# Patient Record
Sex: MALE | Race: WHITE | NOT HISPANIC OR LATINO | URBAN - METROPOLITAN AREA
[De-identification: names, ages, dates, MRNs, and addresses within clinical notes are randomized per-mention and may not be internally consistent; named-entity substitution may affect disease eponyms.]

---

## 2022-06-16 DIAGNOSIS — M54.59 WEIGHT LIFTER'S BACK: Primary | ICD-10-CM

## 2022-07-25 ENCOUNTER — CLINICAL SUPPORT (OUTPATIENT)
Dept: REHABILITATION | Facility: HOSPITAL | Age: 52
End: 2022-07-25
Payer: MEDICAID

## 2022-07-25 DIAGNOSIS — M54.9 BACK PAIN, UNSPECIFIED BACK LOCATION, UNSPECIFIED BACK PAIN LATERALITY, UNSPECIFIED CHRONICITY: ICD-10-CM

## 2022-07-25 DIAGNOSIS — G89.29 CHRONIC RIGHT-SIDED LOW BACK PAIN WITHOUT SCIATICA: ICD-10-CM

## 2022-07-25 DIAGNOSIS — M54.50 CHRONIC RIGHT-SIDED LOW BACK PAIN WITHOUT SCIATICA: ICD-10-CM

## 2022-07-25 PROCEDURE — 97161 PT EVAL LOW COMPLEX 20 MIN: CPT | Mod: PN

## 2022-07-25 NOTE — PLAN OF CARE
[facility]   Physical Therapy Initial Evaluation     Date: 7/25/2022   Name: Pantera Abbott  [facility] Number: [id number]    Therapy Diagnosis:   Encounter Diagnoses   Name Primary?    Back pain, unspecified back location, unspecified back pain laterality, unspecified chronicity     Chronic right-sided low back pain without sciatica      Physician: Freddy Coronel MD    Physician Orders: PT Eval and Treat   Medical Diagnosis from Referral: M54.59 (ICD-10-CM) - Other low back pain   Evaluation Date: 7/25/2022  Authorization Period Expiration: 1/25/2023  Plan of Care Expiration: 11/25/2022  Progress Note Due: 8/25/2022  Visit # / Visits authorized: 1/ pending   FOTO: 1/3    Precautions: Standard     Time In: 1020 am  Time Out: 1100 am  Total Appointment Time (timed & untimed codes): 40 minutes      SUBJECTIVE     Date of onset: 3 weeks ago    History of current condition - Amado reports: chronic history of L4/5 issues (MRI 10 years ago). 3 weeks ago he had a sharp pain around the right side of the thoracic and lumbar spine and the pain lasted for about a week, the next week it was fine but then last week he had another flare up. He has difficulty with any bending activity, he is unable to carry heavy items like taking trash out and such. He is limited with pushing and pulling. Sitting and standing longer periods of time cause an increase in pain at times but not consistent. When in pain, he has a hard time finding comfortable position and had challenges finding position of comfort. Most painful in the mornings. His legs have buckled in the legs but no falls.     Falls: none    Imaging, none:     Prior Therapy: none  Social History: 3-4 steps to enter - can be challenging  Occupation: part time at this time - [employer]  Prior Level of Function: 3 weeks ago he was not having ans restrictions  Current Level of Function: currently unable to perform exercise, weary of reaching and bending.      Pain:  Current 4/10, worst 10/10, best 2/10   Location: right back    Description: Sharp and stabbing, tension, throbbing, has N&T at times  Aggravating Factors: Sitting, Standing and Morning  Easing Factors: pain medication, heating pad, hot bath and epson baths    Patients goals: be able to stand and return to work full time, be able to perform ADLs without sharpness coming on     Medical History:   No past medical history on file.    Surgical History:   Pantera Abbott  has no past surgical history on file.    Medications:   Pantera currently has no medications in their medication list.    Allergies:   Review of patient's allergies indicates:  Not on File       OBJECTIVE     Posture Alignment: overweight, left side lean in a seated position to un weight right side  Increased thoracic kyphosis and decreased lumbar lordosis    LUMBAR SPINE AROM:   Flexion: WFL   Extension: WFL   Left Sidebend: 75%   Right Sidebend: 50% with pain   Left Rotation: 75%   Right Rotation: 50% with pain     SEGMENTAL MOBILITY: R side sharpness and restriction present greatest at T12/L1 with restriction present from T10-L4    LOWER EXTREMITY STRENGTH: within functional limits bilaterally   Left Right   Knee Flex 5/5 5/5   Knee Ext 5/5 5/5     Hip Flexor 4+/5 4+/5   Hip IR 4+/5 4+/5   Hip ER 4+/5 4+/5       Flexibility: tightness in R>L side paraspinals, quadratus lumborum and glute medius    Special Tests:   Left Right   Slump negative negative   SLR challenged negative   BKFO negative negative     GAIT: Pantera ambulates with no assistive device with min antalgic gait with left side lean present and short step length on the right side.       Pt/family was provided educational information, including: role of PT, goals for PT, scheduling - pt verbalized understanding. Discussed insurance limitations with pt.         Limitation/Restriction for FOTO  Survey    Therapist reviewed FOTO scores for Pantera Abbott on 7/25/2022.   FOTO documents  entered into [employer] - see Media section.    Limitation Score: 58%         TREATMENT     Total Treatment time (time-based codes) separate from Evaluation: 05 minutes      Amado received the treatments listed below:      therapeutic exercises to develop strength, endurance, ROM and flexibility for 05 minutes including:  PPT   transverse abdominis contraction      PATIENT EDUCATION AND HOME EXERCISES     Education provided:   -discussed anatomy and how structural changes can cause muscles to be dysfunctional    Written Home Exercises Provided: will be given at next visit. Exercises were reviewed and Amado was able to demonstrate them prior to the end of the session.  Amado demonstrated good  understanding of the education provided. See EMR under Patient Instructions for exercises provided during therapy sessions.    ASSESSMENT     Pantera is a 52 y.o. male referred to outpatient Physical Therapy with a medical diagnosis of M54.59 (ICD-10-CM) - Other low back pain. Patient presents with R side facet restriction and soft tissue dysfunction likely caused by scoliotic posture of increased thoracic kyphosis and flattening of the lumbar spine. He responded well to initial session focused on core engagement activity and education on posture and postioning     Patient prognosis is Good.   Patient will benefit from skilled outpatient Physical Therapy to address the deficits stated above and in the chart below, provide patient /family education, and to maximize patientt's level of independence.     Plan of care discussed with patient: Yes  Patient's spiritual, cultural and educational needs considered and patient is agreeable to the plan of care and goals as stated below:     Anticipated Barriers for therapy: none    Medical Necessity is demonstrated by the following  History  Co-morbidities and personal factors that may impact the plan of care Co-morbidities:   scoliosis    Personal Factors:   no deficits     low    Examination  Body Structures and Functions, activity limitations and participation restrictions that may impact the plan of care Body Regions:   back  lower extremities    Body Systems:    gross symmetry  ROM  strength  balance  gait    Participation Restrictions:   none    Activity limitations:   Learning and applying knowledge  no deficits    General Tasks and Commands  no deficits    Communication  no deficits    Mobility  lifting and carrying objects  driving (bike, car, motorcycle)    Self care  dressing    Domestic Life  doing house work (cleaning house, washing dishes, laundry)    Interactions/Relationships  no deficits    Life Areas  no deficits    Community and Social Life  no deficits         low   Clinical Presentation stable and uncomplicated low   Decision Making/ Complexity Score: low     Goals:  Short Term Goals: 4 weeks   1. Patient will show 25% improvement in lumbar spine side bending and rotation without pain  2. Patient will show 25% reduction in right side muscle tone in the spine  3. Patient will be compliant with home exercise program at all times    Long Term Goals: 8 weeks   1. Patient will be able to stand for > 3 hours for work activity without pain increasing  2. Patient will be able to walk > 60 minutes before onset of pain  3. Patient will be able to lift and carry > 30 # without pain increasing  4. 50% FOTO score improvement    PLAN   Plan of care Certification: 7/25/2022 to 10/25/2022.    Outpatient Physical Therapy 2 times weekly for 8 weeks to include the following interventions: Gait Training, Manual Therapy, Neuromuscular Re-ed, Patient Education, Therapeutic Activities and Therapeutic Exercise.     Piper Raza, PT      I CERTIFY THE NEED FOR THESE SERVICES FURNISHED UNDER THIS PLAN OF TREATMENT AND WHILE UNDER MY CARE   Physician's comments:     Physician's Signature: ___________________________________________________

## 2022-07-27 ENCOUNTER — CLINICAL SUPPORT (OUTPATIENT)
Dept: REHABILITATION | Facility: HOSPITAL | Age: 52
End: 2022-07-27
Payer: MEDICAID

## 2022-07-27 DIAGNOSIS — M54.9 BACK PAIN, UNSPECIFIED BACK LOCATION, UNSPECIFIED BACK PAIN LATERALITY, UNSPECIFIED CHRONICITY: Primary | ICD-10-CM

## 2022-07-27 PROCEDURE — 97110 THERAPEUTIC EXERCISES: CPT | Mod: PN,CQ

## 2022-07-27 NOTE — PROGRESS NOTES
[facility]   Physical Therapy Treatment Note     Name: Pantera Abbott  Clinic Number: [id number]    Therapy Diagnosis:   Encounter Diagnosis   Name Primary?    Back pain, unspecified back location, unspecified back pain laterality, unspecified chronicity Yes     Physician: Freddy Coronel MD    Visit Date: 7/27/2022    Physician Orders: PT Eval and Treat   Medical Diagnosis from Referral: M54.59 (ICD-10-CM) - Other low back pain   Evaluation Date: 7/25/2022  Authorization Period Expiration: 1/25/2023  Plan of Care Expiration: 11/25/2022  Progress Note Due: 8/25/2022  Visit # / Visits authorized: 2/ pending   FOTO: 1/3     Precautions: Standard       PTA Visit #: 1/5     Time In: 1345  Time Out: 1430  Total Billable Time: 45 minutes    SUBJECTIVE     Pt reports: that he is enthusiastic for treatment and wants to be proactive with treatment for Low Back Pain.    He was compliant with home exercise program.  Response to previous treatment: no adverse reactions  Functional change: no reported change    Pain: 1/10  Location: right back      OBJECTIVE     Objective Measures updated at progress report unless specified.     Treatment     Amado received the treatments listed below:      therapeutic exercises to develop strength, endurance, posture and core stabilization for 45 minutes including:    Seated Scap retraction 5sec 15x  Seated Upright Posture 1min 4x  Standing Rows with green theratube 2min  Standing shoulder extension with green theratube 2min  Standing shoulder Depression with green theratube 2min  Standing Palloff press with green theratube 2min  Supine transabdmonianl bracing 5sec 15x  Bilateral SLR 2x10  Bridges         manual therapy techniques: Joint mobilizations, Soft tissue Mobilization and Friction Massage were applied to the: low back for 00 minutes, including:      Not completed at today's treatment session                  Patient Education and Home Exercises     Home  Exercises Provided and Patient Education Provided     Education provided:     Complete all exercise and acitivities of daily living in pain free range      Written Home Exercises Provided: Patient instructed to cont prior HEP. Exercises were reviewed and Amado was able to demonstrate them prior to the end of the session.  Amado demonstrated good  understanding of the education provided. See EMR under Patient Instructions for exercises provided during therapy sessions    ASSESSMENT     Good tolerance to exercise patient able to incorporate new exercise in a pain free range.  Patient did require some verbal cueing for correct positioning and sequencing of exercise.      Amado Is progressing well towards his goals.   Pt prognosis is Good.     Pt will continue to benefit from skilled outpatient physical therapy to address the deficits listed in the problem list box on initial evaluation, provide pt/family education and to maximize pt's level of independence in the home and community environment.     Pt's spiritual, cultural and educational needs considered and pt agreeable to plan of care and goals.     Anticipated barriers to physical therapy: none    Goals:     Short Term Goals: 4 weeks   1. Patient will show 25% improvement in lumbar spine side bending and rotation without pain  2. Patient will show 25% reduction in right side muscle tone in the spine  3. Patient will be compliant with home exercise program at all times     Long Term Goals: 8 weeks   1. Patient will be able to stand for > 3 hours for work activity without pain increasing  2. Patient will be able to walk > 60 minutes before onset of pain  3. Patient will be able to lift and carry > 30 # without pain increasing  4. 50% FOTO score improvement      PLAN     Progress as tolerated per Plan of Care      Arturo Crow, ANTONINO

## 2022-08-01 ENCOUNTER — CLINICAL SUPPORT (OUTPATIENT)
Dept: REHABILITATION | Facility: HOSPITAL | Age: 52
End: 2022-08-01
Payer: MEDICAID

## 2022-08-01 DIAGNOSIS — M54.50 CHRONIC RIGHT-SIDED LOW BACK PAIN WITHOUT SCIATICA: ICD-10-CM

## 2022-08-01 DIAGNOSIS — M54.9 BACK PAIN, UNSPECIFIED BACK LOCATION, UNSPECIFIED BACK PAIN LATERALITY, UNSPECIFIED CHRONICITY: Primary | ICD-10-CM

## 2022-08-01 DIAGNOSIS — G89.29 CHRONIC RIGHT-SIDED LOW BACK PAIN WITHOUT SCIATICA: ICD-10-CM

## 2022-08-01 PROCEDURE — 97110 THERAPEUTIC EXERCISES: CPT | Mod: PN

## 2022-08-01 PROCEDURE — 97140 MANUAL THERAPY 1/> REGIONS: CPT | Mod: PN

## 2022-08-01 NOTE — PROGRESS NOTES
[facility]   Physical Therapy Treatment Note     Name: Pantera Abbott  Clinic Number: [id number]    Therapy Diagnosis:   Encounter Diagnoses   Name Primary?    Back pain, unspecified back location, unspecified back pain laterality, unspecified chronicity Yes    Chronic right-sided low back pain without sciatica      Physician: Freddy Coronel MD    Visit Date: 8/1/2022    Physician Orders: PT Eval and Treat   Medical Diagnosis from Referral: M54.59 (ICD-10-CM) - Other low back pain   Evaluation Date: 7/25/2022  Authorization Period Expiration: 1/25/2023  Plan of Care Expiration: 11/25/2022  Progress Note Due: 8/25/2022  Visit # / Visits authorized: 2/ 20 + eval   FOTO: 1/3     Precautions: Standard       PTA Visit #: 0/5     Time In: 1015 am  Time Out: 1100 am  Total Billable Time: 45 minutes    SUBJECTIVE     Pt reports: doing better overall, wants to go back to the gym but does not want to go too early    He was compliant with home exercise program.  Response to previous treatment: no adverse reactions  Functional change: no reported change    Pain: 1/10  Location: right back      OBJECTIVE     Objective Measures updated at progress report unless specified.     Treatment     Amado received the treatments listed below:      therapeutic exercises to develop strength, endurance, posture and core stabilization for 25 minutes including:    Seated Scap retraction 5sec 15x  Seated Upright Posture 1min 4x  Standing Rows with green theratube 2min  Standing shoulder extension with green theratube 2min  Standing shoulder Depression with green theratube 2min  Standing Palloff press with green theratube 2min  Supine transabdmonianl bracing 5sec 15x  Bilateral SLR 2x10  Bridges X 30        manual therapy techniques: Joint mobilizations, Soft tissue Mobilization and Friction Massage were applied to the: low back for 10 minutes, including:  R>L side MFR to paraspinals, quadratus lumborum   UPA  T4-10 R>L side grade 2-3        Patient Education and Home Exercises     Home Exercises Provided and Patient Education Provided     Education provided: increased ex with HEP discussed duration and increasing frequency of HEP  Complete all exercise and acitivities of daily living in pain free range      Written Home Exercises Provided: Patient instructed to cont prior HEP. Exercises were reviewed and Amado was able to demonstrate them prior to the end of the session.  Amado demonstrated good  understanding of the education provided. See EMR under Patient Instructions for exercises provided during therapy sessions    ASSESSMENT     Progressing well with functional strengthening and standing tolerance with  Core muscle engagement is progressing well.He responded well to grade 2-3 joint mobilziations and standing core strengthening exercises    Amado Is progressing well towards his goals.   Pt prognosis is Good.     Pt will continue to benefit from skilled outpatient physical therapy to address the deficits listed in the problem list box on initial evaluation, provide pt/family education and to maximize pt's level of independence in the home and community environment.     Pt's spiritual, cultural and educational needs considered and pt agreeable to plan of care and goals.     Anticipated barriers to physical therapy: none    Goals:     Short Term Goals: 4 weeks   1. Patient will show 25% improvement in lumbar spine side bending and rotation without pain  2. Patient will show 25% reduction in right side muscle tone in the spine  3. Patient will be compliant with home exercise program at all times     Long Term Goals: 8 weeks   1. Patient will be able to stand for > 3 hours for work activity without pain increasing  2. Patient will be able to walk > 60 minutes before onset of pain  3. Patient will be able to lift and carry > 30 # without pain increasing  4. 50% FOTO score improvement      PLAN     Progress as tolerated  per Plan of Care      Piper Raza, PT

## 2022-08-03 ENCOUNTER — CLINICAL SUPPORT (OUTPATIENT)
Dept: REHABILITATION | Facility: HOSPITAL | Age: 52
End: 2022-08-03
Payer: MEDICAID

## 2022-08-03 DIAGNOSIS — M54.9 BACK PAIN, UNSPECIFIED BACK LOCATION, UNSPECIFIED BACK PAIN LATERALITY, UNSPECIFIED CHRONICITY: Primary | ICD-10-CM

## 2022-08-03 PROCEDURE — 97110 THERAPEUTIC EXERCISES: CPT | Mod: PN,CQ

## 2022-08-03 NOTE — PROGRESS NOTES
[facility]   Physical Therapy Treatment Note     Name: Pantera Abbott  Clinic Number: [id number]    Therapy Diagnosis:   Encounter Diagnosis   Name Primary?    Back pain, unspecified back location, unspecified back pain laterality, unspecified chronicity Yes     Physician: Freddy Coronel MD    Visit Date: 8/3/2022    Physician Orders: PT Eval and Treat   Medical Diagnosis from Referral: M54.59 (ICD-10-CM) - Other low back pain   Evaluation Date: 7/25/2022  Authorization Period Expiration: 1/25/2023  Plan of Care Expiration: 11/25/2022  Progress Note Due: 8/25/2022  Visit # / Visits authorized: 2/ 20 + eval   FOTO: 1/3     Precautions: Standard       PTA Visit #: 0/5     Time In: 1430  Time Out: 1515  Total Billable Time: 45 minutes    SUBJECTIVE     Pt reports: that he is able to complete his acitivities of daily living a little better each day.  Patient states that he is having decreased symptoms a little more each day.      He was compliant with home exercise program.  Response to previous treatment: decreased symptoms  Functional change: improved acitivities of daily living     Pain: 2/10  Location: right back      OBJECTIVE     Objective Measures updated at progress report unless specified.     Treatment     Amado received the treatments listed below:      therapeutic exercises to develop strength, endurance, posture and core stabilization for 25 minutes including:    Seated Scap retraction 5sec 20x  Seated Upright Posture 1min 4x  Standing Rows with green theratube 2min  Standing shoulder extension with blue theratube 2min  Standing shoulder Depression with blue theratube 2min  Standing Palloff press with blue theratube 3x10 ea  Supine transabdmonianl bracing 5sec 15x  Bilateral SLR 3x12  Bridges X 36        manual therapy techniques: Joint mobilizations, Soft tissue Mobilization and Friction Massage were applied to the: low back for 00 minutes, including:    R>L side MFR to  paraspinals, quadratus lumborum   UPA T4-10 R>L side grade 2-3        Patient Education and Home Exercises     Home Exercises Provided and Patient Education Provided     Education provided: increased ex with HEP discussed duration and increasing frequency of HEP  Complete all exercise and acitivities of daily living in pain free range      Written Home Exercises Provided: Patient instructed to cont prior HEP. Exercises were reviewed and Amado was able to demonstrate them prior to the end of the session.  Amado demonstrated good  understanding of the education provided. See EMR under Patient Instructions for exercises provided during therapy sessions    ASSESSMENT     Good tolerance to exercise patient able to progress well with reps for exercise.  Patient had no reported increase in symptoms with exercise.    Amado Is progressing well towards his goals.   Pt prognosis is Good.     Pt will continue to benefit from skilled outpatient physical therapy to address the deficits listed in the problem list box on initial evaluation, provide pt/family education and to maximize pt's level of independence in the home and community environment.     Pt's spiritual, cultural and educational needs considered and pt agreeable to plan of care and goals.     Anticipated barriers to physical therapy: none    Goals:     Short Term Goals: 4 weeks   1. Patient will show 25% improvement in lumbar spine side bending and rotation without pain  2. Patient will show 25% reduction in right side muscle tone in the spine  3. Patient will be compliant with home exercise program at all times     Long Term Goals: 8 weeks   1. Patient will be able to stand for > 3 hours for work activity without pain increasing  2. Patient will be able to walk > 60 minutes before onset of pain  3. Patient will be able to lift and carry > 30 # without pain increasing  4. 50% FOTO score improvement      PLAN     Progress as tolerated per Plan of Care      Arturo  Nickel, PTA

## 2022-08-22 ENCOUNTER — CLINICAL SUPPORT (OUTPATIENT)
Dept: REHABILITATION | Facility: HOSPITAL | Age: 52
End: 2022-08-22
Payer: MEDICAID

## 2022-08-22 DIAGNOSIS — M54.50 LOW BACK PAIN, UNSPECIFIED BACK PAIN LATERALITY, UNSPECIFIED CHRONICITY, UNSPECIFIED WHETHER SCIATICA PRESENT: ICD-10-CM

## 2022-08-22 DIAGNOSIS — M54.9 BACK PAIN, UNSPECIFIED BACK LOCATION, UNSPECIFIED BACK PAIN LATERALITY, UNSPECIFIED CHRONICITY: Primary | ICD-10-CM

## 2022-08-22 PROCEDURE — 97110 THERAPEUTIC EXERCISES: CPT | Mod: PN,CQ

## 2022-08-22 NOTE — PROGRESS NOTES
[facility]   Physical Therapy Treatment Note     Name: Pantera Abbott  Clinic Number: [id number]    Therapy Diagnosis:   Encounter Diagnoses   Name Primary?    Back pain, unspecified back location, unspecified back pain laterality, unspecified chronicity Yes    Low back pain, unspecified back pain laterality, unspecified chronicity, unspecified whether sciatica present      Physician: Freddy Coronel MD    Visit Date: 8/22/2022    Physician Orders: PT Eval and Treat   Medical Diagnosis from Referral: M54.59 (ICD-10-CM) - Other low back pain   Evaluation Date: 7/25/2022  Authorization Period Expiration: 1/25/2023  Plan of Care Expiration: 11/25/2022  Progress Note Due: 8/25/2022  Visit # / Visits authorized: 3/ 20 + eval   FOTO: 1/3     Precautions: Standard       PTA Visit #: 2/5     Time In: 1020  Time Out: 1100  Total Billable Time: 40 minutes    SUBJECTIVE     Pt reports: that he has missed some treatments sessions  Because he was having a lot of Low Back Pain.  Patient states that he is better today and that he got his MRI back.  Patent reports that he has bulging discs L1-L5.  Patient states that he has Degenrative Disc.     He was compliant with home exercise program.  Response to previous treatment: decreased symptoms  Functional change: improved acitivities of daily living     Pain: 1/10  Location: right back      OBJECTIVE     Objective Measures updated at progress report unless specified.     Treatment     Amado received the treatments listed below:      therapeutic exercises to develop strength, endurance, posture and core stabilization for 25 minutes including:    Seated Scap retraction 5sec 20x  Seated Upright Posture 1min 4x  Standing Rows with blue theratube 2min  Standing shoulder extension with blue theratube 2min  Standing shoulder Depression with blue theratube 2min  Standing Palloff press with blue theratube 3x10 ea  Supine transabdmonianl bracing 5sec  15x  Bilateral SLR 3x12  Bridges X 36  DKTC with Green Swiss Ball 2min  LTR with green Swiss Ball 2min          manual therapy techniques: Joint mobilizations, Soft tissue Mobilization and Friction Massage were applied to the: low back for 00 minutes, including:    R>L side MFR to paraspinals, quadratus lumborum   UPA T4-10 R>L side grade 2-3        Patient Education and Home Exercises     Home Exercises Provided and Patient Education Provided     Education provided: increased ex with HEP discussed duration and increasing frequency of HEP  Complete all exercise and acitivities of daily living in pain free range      Written Home Exercises Provided: Patient instructed to cont prior HEP. Exercises were reviewed and Amado was able to demonstrate them prior to the end of the session.  Amado demonstrated good  understanding of the education provided. See EMR under Patient Instructions for exercises provided during therapy sessions    ASSESSMENT     Good tolerance to exercise patient able to resume previous reps and intenityfor exercise.  Patient had no reported increase in symptoms with exercise.  Added in LTR and DKTC with reported pain or adverse reactions.    Amado Is progressing well towards his goals.   Pt prognosis is Good.     Pt will continue to benefit from skilled outpatient physical therapy to address the deficits listed in the problem list box on initial evaluation, provide pt/family education and to maximize pt's level of independence in the home and community environment.     Pt's spiritual, cultural and educational needs considered and pt agreeable to plan of care and goals.     Anticipated barriers to physical therapy: none    Goals:     Short Term Goals: 4 weeks   1. Patient will show 25% improvement in lumbar spine side bending and rotation without pain  2. Patient will show 25% reduction in right side muscle tone in the spine  3. Patient will be compliant with home exercise program at all times      Long Term Goals: 8 weeks   1. Patient will be able to stand for > 3 hours for work activity without pain increasing  2. Patient will be able to walk > 60 minutes before onset of pain  3. Patient will be able to lift and carry > 30 # without pain increasing  4. 50% FOTO score improvement      PLAN     Progress as tolerated per Plan of Care      Arturo Crow PTA

## 2022-08-24 ENCOUNTER — CLINICAL SUPPORT (OUTPATIENT)
Dept: REHABILITATION | Facility: HOSPITAL | Age: 52
End: 2022-08-24
Payer: MEDICAID

## 2022-08-24 DIAGNOSIS — M54.50 CHRONIC BILATERAL LOW BACK PAIN, UNSPECIFIED WHETHER SCIATICA PRESENT: ICD-10-CM

## 2022-08-24 DIAGNOSIS — G89.29 CHRONIC BILATERAL LOW BACK PAIN, UNSPECIFIED WHETHER SCIATICA PRESENT: ICD-10-CM

## 2022-08-24 DIAGNOSIS — M54.9 BACK PAIN, UNSPECIFIED BACK LOCATION, UNSPECIFIED BACK PAIN LATERALITY, UNSPECIFIED CHRONICITY: Primary | ICD-10-CM

## 2022-08-24 PROCEDURE — 97110 THERAPEUTIC EXERCISES: CPT | Mod: PN

## 2022-08-24 PROCEDURE — 97530 THERAPEUTIC ACTIVITIES: CPT | Mod: PN

## 2022-08-24 NOTE — PROGRESS NOTES
[facility]   Physical Therapy Treatment Note     Name: Pantera Abbott  Clinic Number: [id number]    Therapy Diagnosis:   Encounter Diagnoses   Name Primary?    Back pain, unspecified back location, unspecified back pain laterality, unspecified chronicity Yes    Chronic bilateral low back pain, unspecified whether sciatica present      Physician: Freddy Coronel MD    Visit Date: 8/24/2022    Physician Orders: PT Eval and Treat   Medical Diagnosis from Referral: M54.59 (ICD-10-CM) - Other low back pain   Evaluation Date: 7/25/2022  Authorization Period Expiration: 1/25/2023  Plan of Care Expiration: 11/25/2022  Progress Note Due: 9/25/2022  Visit # / Visits authorized: 5/ 20 + eval   FOTO: 1/3     Precautions: Standard       PTA Visit #: 0/5     Time In: 225 pm  Time Out: 305 pm  Total Billable Time: 40 minutes    SUBJECTIVE     Pt reports: he is feeling much better today after this last flare up, had a massage yesterday and is feeling much better today, improvement noted into his posture while working too    He was compliant with home exercise program.  Response to previous treatment: decreased symptoms  Functional change: improved acitivities of daily living     Pain: 1/10  Location: right back      OBJECTIVE     Objective Measures updated at progress report unless specified.     Treatment     Amado received the treatments listed below:      therapeutic exercises to develop strength, endurance, posture and core stabilization for 40 minutes including:    Seated Scap retraction 5sec 20x  Seated Upright Posture 1min 4x  Standing Rows with blue + yellow theratube X 30  Standing shoulder extension with blue + yellow theratube X 30  Standing shoulder Depression with blue theratube 2min  Standing Palloff press with blue theratube 3x10 ea  Supine transabdmonianl bracing 5sec 15x - opposing diagonals with Green Ball  Bilateral SLR 3x12  Bridges X 36  DKTC with Green Swiss Ball 2min  LTR  with green Swiss Ball 2min  Side steps OTB 2 minutes      manual therapy techniques: Joint mobilizations, Soft tissue Mobilization and Friction Massage were applied to the: low back for 00 minutes, including:    R>L side MFR to paraspinals, quadratus lumborum   UPA T4-10 R>L side grade 2-3        Patient Education and Home Exercises     Home Exercises Provided and Patient Education Provided     Education provided: return to exercise progressions discussed      Written Home Exercises Provided: Patient instructed to cont prior HEP. Exercises were reviewed and Amado was able to demonstrate them prior to the end of the session.  Amado demonstrated good  understanding of the education provided. See EMR under Patient Instructions for exercises provided during therapy sessions    ASSESSMENT     Continues to respond well to lateral hip and core strengthening. He is progressing well with dynamic core stability. Long discussion about return to free weights and importance of stabilizing through the core while using weights. Continues to progress dynamic core stability as tolerated    Amado Is progressing well towards his goals.   Pt prognosis is Good.     Pt will continue to benefit from skilled outpatient physical therapy to address the deficits listed in the problem list box on initial evaluation, provide pt/family education and to maximize pt's level of independence in the home and community environment.     Pt's spiritual, cultural and educational needs considered and pt agreeable to plan of care and goals.     Anticipated barriers to physical therapy: none    Goals:     Short Term Goals: 4 weeks   1. Patient will show 25% improvement in lumbar spine side bending and rotation without pain  2. Patient will show 25% reduction in right side muscle tone in the spine  3. Patient will be compliant with home exercise program at all times     Long Term Goals: 8 weeks   1. Patient will be able to stand for > 3 hours for work  activity without pain increasing  2. Patient will be able to walk > 60 minutes before onset of pain  3. Patient will be able to lift and carry > 30 # without pain increasing  4. 50% FOTO score improvement      PLAN     Progress as tolerated per Plan of Care      Piper Raza, PT

## 2022-08-29 ENCOUNTER — CLINICAL SUPPORT (OUTPATIENT)
Dept: REHABILITATION | Facility: HOSPITAL | Age: 52
End: 2022-08-29
Payer: MEDICAID

## 2022-08-29 DIAGNOSIS — M54.9 BACK PAIN, UNSPECIFIED BACK LOCATION, UNSPECIFIED BACK PAIN LATERALITY, UNSPECIFIED CHRONICITY: Primary | ICD-10-CM

## 2022-08-29 DIAGNOSIS — M54.50 CHRONIC RIGHT-SIDED LOW BACK PAIN WITHOUT SCIATICA: ICD-10-CM

## 2022-08-29 DIAGNOSIS — G89.29 CHRONIC RIGHT-SIDED LOW BACK PAIN WITHOUT SCIATICA: ICD-10-CM

## 2022-08-29 PROCEDURE — 97110 THERAPEUTIC EXERCISES: CPT | Mod: PN,CQ

## 2022-08-29 NOTE — PROGRESS NOTES
[facility]   Physical Therapy Treatment Note     Name: Pantera Abbott  Clinic Number: [id number]    Therapy Diagnosis:   Encounter Diagnoses   Name Primary?    Back pain, unspecified back location, unspecified back pain laterality, unspecified chronicity Yes    Chronic right-sided low back pain without sciatica      Physician: Freddy Coronel MD    Visit Date: 8/29/2022    Physician Orders: PT Eval and Treat   Medical Diagnosis from Referral: M54.59 (ICD-10-CM) - Other low back pain   Evaluation Date: 7/25/2022  Authorization Period Expiration: 1/25/2023  Plan of Care Expiration: 11/25/2022  Progress Note Due: 9/25/2022  Visit # / Visits authorized: 6/ 20 + eval   FOTO: 1/3     Precautions: Standard       PTA Visit #: 1/5     Time In: 1030  Time Out: 1100  Total Billable Time: 30 minutes    SUBJECTIVE     Pt reports: that he is not having pain today and has been having decreased symptoms between treatment sessions.  Patient states that he is able to have reduced symptoms when completing acitivities of daily living.    He was compliant with home exercise program.  Response to previous treatment: decreased symptoms  Functional change: improved acitivities of daily living     Pain: 0/10  Location: right back      OBJECTIVE     Objective Measures updated at progress report unless specified.     Treatment     Amado received the treatments listed below:      therapeutic exercises to develop strength, endurance, posture and core stabilization for 40 minutes including:    Seated Scap retraction 5sec 20x  Seated Upright Posture 1min 4x  Standing Rows with blue + yellow theratube X 30  Standing shoulder extension with blue + yellow theratube X 30  Standing shoulder Depression with blue theratube 2min  Standing Palloff press with blue theratube 2x15 ea  Supine transabdmonianl bracing 5sec 15x - opposing diagonals with Green Ball  Bilateral SLR 3x12  Bridges X 36  DKTC with Green Swiss Ball  2min  LTR with green Swiss Ball 2min  Side steps OTB 2 minutes      manual therapy techniques: Joint mobilizations, Soft tissue Mobilization and Friction Massage were applied to the: low back for 00 minutes, including:    R>L side MFR to paraspinals, quadratus lumborum   UPA T4-10 R>L side grade 2-3        Patient Education and Home Exercises     Home Exercises Provided and Patient Education Provided     Education provided: return to exercise progressions discussed      Written Home Exercises Provided: Patient instructed to cont prior HEP. Exercises were reviewed and Amado was able to demonstrate them prior to the end of the session.  Amado demonstrated good  understanding of the education provided. See EMR under Patient Instructions for exercises provided during therapy sessions    ASSESSMENT     Continues to respond well to lateral hip and core strengthening. He is progressing well with dynamic core stability. Long discussion about return to free weights and importance of stabilizing through the core while using weights. Continues to progress dynamic core stability as tolerated    Amado Is progressing well towards his goals.   Pt prognosis is Good.     Pt will continue to benefit from skilled outpatient physical therapy to address the deficits listed in the problem list box on initial evaluation, provide pt/family education and to maximize pt's level of independence in the home and community environment.     Pt's spiritual, cultural and educational needs considered and pt agreeable to plan of care and goals.     Anticipated barriers to physical therapy: none    Goals:     Short Term Goals: 4 weeks   1. Patient will show 25% improvement in lumbar spine side bending and rotation without pain  2. Patient will show 25% reduction in right side muscle tone in the spine  3. Patient will be compliant with home exercise program at all times     Long Term Goals: 8 weeks   1. Patient will be able to stand for > 3 hours  for work activity without pain increasing  2. Patient will be able to walk > 60 minutes before onset of pain  3. Patient will be able to lift and carry > 30 # without pain increasing  4. 50% FOTO score improvement      PLAN     Progress as tolerated per Plan of Care      Arturo Crow PTA

## 2022-08-31 ENCOUNTER — CLINICAL SUPPORT (OUTPATIENT)
Dept: REHABILITATION | Facility: HOSPITAL | Age: 52
End: 2022-08-31
Payer: MEDICAID

## 2022-08-31 DIAGNOSIS — G89.29 CHRONIC RIGHT-SIDED LOW BACK PAIN WITHOUT SCIATICA: ICD-10-CM

## 2022-08-31 DIAGNOSIS — M54.9 BACK PAIN, UNSPECIFIED BACK LOCATION, UNSPECIFIED BACK PAIN LATERALITY, UNSPECIFIED CHRONICITY: Primary | ICD-10-CM

## 2022-08-31 DIAGNOSIS — M54.50 CHRONIC RIGHT-SIDED LOW BACK PAIN WITHOUT SCIATICA: ICD-10-CM

## 2022-08-31 PROCEDURE — 97110 THERAPEUTIC EXERCISES: CPT | Mod: PN,CQ

## 2022-08-31 NOTE — PROGRESS NOTES
[facility]   Physical Therapy Treatment Note     Name: Pantera Abbott  Clinic Number: [id number]    Therapy Diagnosis:   Encounter Diagnoses   Name Primary?    Back pain, unspecified back location, unspecified back pain laterality, unspecified chronicity Yes    Chronic right-sided low back pain without sciatica      Physician: Freddy Coronel MD    Visit Date: 8/31/2022    Physician Orders: PT Eval and Treat   Medical Diagnosis from Referral: M54.59 (ICD-10-CM) - Other low back pain   Evaluation Date: 7/25/2022  Authorization Period Expiration: 1/25/2023  Plan of Care Expiration: 11/25/2022  Progress Note Due: 9/25/2022  Visit # / Visits authorized: 7/ 20 + eval   FOTO: 1/3     Precautions: Standard       PTA Visit #: 2/5     Time In: 1415  Time Out: 1500  Total Billable Time: 45 minutes    SUBJECTIVE     Pt reports: that he continues to be pain free.  Patient states that he is completing his HEP and taking resk breaks and days off exercise for appropriate rest periods.  Pateinet states that today is his last treatment session and will be continuing with HEP.    He was compliant with home exercise program.  Response to previous treatment: decreased symptoms  Functional change: improved acitivities of daily living     Pain: 0/10  Location: right back      OBJECTIVE     Objective Measures updated at progress report unless specified.     Treatment     Amado received the treatments listed below:      therapeutic exercises to develop strength, endurance, posture and core stabilization for 40 minutes including:    Seated Scap retraction 5sec 20x  Seated Upright Posture 1min 4x  Nustep L2 8min  Standing Rows with blue + yellow theratube 2min  Standing shoulder extension with blue + yellow theratube 2min  Standing shoulder Depression with blue theratube 2min  Standing Palloff press with blue theratube 2x1ea  Supine transabdmonianl bracing 5sec 15x - opposing diagonals with Green  Ball  Bilateral SLR 3x15  Bridges X 40  DKTC with Green Swiss Ball 2min  Bilateral Piriformis Stretch 10sec 10x  LTR with green Swiss Ball 2min  Side steps OTB 3 minutes      manual therapy techniques: Joint mobilizations, Soft tissue Mobilization and Friction Massage were applied to the: low back for 00 minutes, including:    R>L side MFR to paraspinals, quadratus lumborum   UPA T4-10 R>L side grade 2-3        Patient Education and Home Exercises     Home Exercises Provided and Patient Education Provided     Education provided: return to exercise progressions discussed      Written Home Exercises Provided: Patient instructed to cont prior HEP. Exercises were reviewed and Amado was able to demonstrate them prior to the end of the session.  Amado demonstrated good  understanding of the education provided. See EMR under Patient Instructions for exercises provided during therapy sessions    ASSESSMENT     Good tolerance to exercise.  Patient continues to be pain free with and after exercise.  Ludmilanet continues to improve with hip and core activation.    Amado Is progressing well towards his goals.   Pt prognosis is Good.     Pt will continue to benefit from skilled outpatient physical therapy to address the deficits listed in the problem list box on initial evaluation, provide pt/family education and to maximize pt's level of independence in the home and community environment.     Pt's spiritual, cultural and educational needs considered and pt agreeable to plan of care and goals.     Anticipated barriers to physical therapy: none    Goals:     Short Term Goals: 4 weeks   1. Patient will show 25% improvement in lumbar spine side bending and rotation without pain  2. Patient will show 25% reduction in right side muscle tone in the spine  3. Patient will be compliant with home exercise program at all times     Long Term Goals: 8 weeks   1. Patient will be able to stand for > 3 hours for work activity without pain  increasing  2. Patient will be able to walk > 60 minutes before onset of pain  3. Patient will be able to lift and carry > 30 # without pain increasing  4. 50% FOTO score improvement      PLAN     Progress as tolerated per Plan of Care      Arturo Crow PTA

## 2024-06-25 ENCOUNTER — OFFICE VISIT (OUTPATIENT)
Dept: URGENT CARE | Facility: CLINIC | Age: 54
End: 2024-06-25
Payer: MEDICAID

## 2024-06-25 VITALS
RESPIRATION RATE: 20 BRPM | HEART RATE: 66 BPM | TEMPERATURE: 99 F | SYSTOLIC BLOOD PRESSURE: 122 MMHG | WEIGHT: 244.94 LBS | DIASTOLIC BLOOD PRESSURE: 79 MMHG | OXYGEN SATURATION: 99 %

## 2024-06-25 DIAGNOSIS — H61.23 BILATERAL IMPACTED CERUMEN: Primary | ICD-10-CM

## 2024-06-25 PROCEDURE — 99212 OFFICE O/P EST SF 10 MIN: CPT | Mod: S$GLB,,,

## 2024-06-25 RX ORDER — OMEPRAZOLE 40 MG/1
1 CAPSULE, DELAYED RELEASE ORAL DAILY
COMMUNITY
Start: 2024-06-17 | End: 2025-06-17

## 2024-06-25 RX ORDER — BALSALAZIDE DISODIUM 750 MG/1
2250 CAPSULE ORAL
COMMUNITY
Start: 2024-06-17 | End: 2025-06-17

## 2024-06-25 NOTE — PROGRESS NOTES
Subjective:      Patient ID: Pantera Abbott is a 54 y.o. male.    Vitals:  weight is 111.1 kg (244 lb 14.9 oz). His oral temperature is 98.5 °F (36.9 °C). His blood pressure is 122/79 and his pulse is 66. His respiration is 20 and oxygen saturation is 99%.     Chief Complaint: Ear Fullness    54-year-old male presents to the clinic today with chief complaint of right ear clogged and ear pressure. Symptoms started three days ago and have worsened.  He states he tripped and fell on Saturday and that is when he first noticed his ear was clogged. Patient has tried otc ear drops with no relief.  Denies any recent ill exposures. Denies any recent travel.  Denies history of seasonal allergies. Denies hx of asthma. Denies numbness or tingling. Denies radiation of pain. Denies fever, chills, body aches, chest pain, shortness of breath, wheezing, abdominal pain, nausea, vomiting, diarrhea, or rashes.              Ear Fullness   There is pain in the right ear. This is a new problem. The current episode started in the past 7 days. The problem occurs constantly. The problem has been rapidly improving. There has been no fever. The patient is experiencing no pain. Pertinent negatives include no abdominal pain, coughing, diarrhea, ear discharge, headaches, hearing loss, neck pain, rash, sore throat or vomiting. He has tried ear drops for the symptoms. The treatment provided no relief.       Constitution: Negative for activity change, appetite change, chills, sweating, fatigue, fever and generalized weakness.   HENT:  Positive for ear pain. Negative for ear discharge, foreign body in ear, tinnitus, hearing loss, facial swelling, congestion, nosebleeds, foreign body in nose, postnasal drip, sinus pain, sinus pressure, sore throat, trouble swallowing and voice change.    Neck: Negative for neck pain, neck stiffness and neck swelling.   Cardiovascular:  Negative for chest pain, leg swelling, palpitations and sob on exertion.   Eyes:   Negative for eye discharge, eye itching, eye pain and eye redness.   Respiratory:  Negative for chest tightness, cough, sputum production, shortness of breath, wheezing and asthma.    Gastrointestinal:  Negative for abdominal pain, nausea, vomiting, constipation and diarrhea.   Genitourinary:  Negative for dysuria, frequency, urgency, urine decreased, flank pain and hematuria.   Musculoskeletal:  Negative for pain, pain with walking and muscle ache.   Skin:  Negative for rash, erythema and bruising.   Allergic/Immunologic: Negative for environmental allergies, seasonal allergies, food allergies, asthma, chronic cough, sneezing and flu shot.   Neurological:  Negative for dizziness, light-headedness, passing out, coordination disturbances, loss of balance, headaches, disorientation and altered mental status.   Psychiatric/Behavioral:  Negative for altered mental status, disorientation, confusion, agitation and nervous/anxious. The patient is not nervous/anxious.       Objective:     Physical Exam   Constitutional: He is oriented to person, place, and time. He appears well-developed. He is cooperative.  Non-toxic appearance. He does not appear ill. No distress. normal  HENT:   Head: Normocephalic and atraumatic.   Ears:   Right Ear: Hearing, tympanic membrane, external ear and ear canal normal. impacted cerumen  Left Ear: Hearing, tympanic membrane, external ear and ear canal normal. impacted cerumen  Nose: Nose normal. No mucosal edema, rhinorrhea, purulent discharge or nasal deformity. No epistaxis. Right sinus exhibits no maxillary sinus tenderness and no frontal sinus tenderness. Left sinus exhibits no maxillary sinus tenderness and no frontal sinus tenderness.   Mouth/Throat: Uvula is midline, oropharynx is clear and moist and mucous membranes are normal. No trismus in the jaw. Normal dentition. No uvula swelling. No oropharyngeal exudate, posterior oropharyngeal edema, posterior oropharyngeal erythema, tonsillar  abscesses or cobblestoning.   Following ear wax removal, TM was normal and canal was normal bilaterally.       Comments: Following ear wax removal, TM was normal and canal was normal bilaterally.   Eyes: Conjunctivae and lids are normal. No scleral icterus. Extraocular movement intact   Neck: Trachea normal and phonation normal. Neck supple. No edema present. No erythema present. No neck rigidity present.   Cardiovascular: Normal rate, regular rhythm, normal heart sounds and normal pulses.   Pulmonary/Chest: Effort normal and breath sounds normal. No stridor. No respiratory distress. He has no decreased breath sounds. He has no wheezes. He has no rhonchi. He has no rales.   Abdominal: Normal appearance.   Musculoskeletal: Normal range of motion.         General: No deformity. Normal range of motion.   Lymphadenopathy:     He has no cervical adenopathy.   Neurological: He is alert and oriented to person, place, and time. He exhibits normal muscle tone. Coordination normal.   Skin: Skin is warm, dry, intact, not diaphoretic and not pale. No erythema   Psychiatric: His speech is normal and behavior is normal. Judgment and thought content normal.   Nursing note and vitals reviewed.      Assessment:     1. Bilateral impacted cerumen        Plan:       Bilateral impacted cerumen  -     Ear wax removal      We had shared decision making for patient's treatment. We discussed side effects/alternatives/benefits/risk and patient would like to proceed with treatment plan. We also discussed other OTC treatment recommendations. Patient was counseled, explained with the test results meaning, expected course, and answered all of questions. Patient can take OTC Acetaminophen (Tylenol) and/or Ibuprofen (Motrin) as needed for pain relief and/or fever relief. Continue to drink plenty of fluids. Follow up with PCP in the next 1-2 weeks as needed.  Gave patient strict ER/urgent care precautions in case symptoms worsen or if any new  concerns arise.

## 2025-01-02 DIAGNOSIS — M47.10 SPONDYLOSIS WITH MYELOPATHY: ICD-10-CM

## 2025-01-02 DIAGNOSIS — M54.2 CERVICALGIA: Primary | ICD-10-CM

## 2025-01-13 DIAGNOSIS — M54.2 CERVICALGIA: Primary | ICD-10-CM

## 2025-01-14 ENCOUNTER — HOSPITAL ENCOUNTER (OUTPATIENT)
Dept: RADIOLOGY | Facility: HOSPITAL | Age: 55
Discharge: HOME | End: 2025-01-14
Attending: PHYSICAL MEDICINE & REHABILITATION
Payer: MEDICAID

## 2025-01-14 DIAGNOSIS — M54.2 CERVICALGIA: ICD-10-CM

## 2025-01-14 PROCEDURE — 72050 X-RAY EXAM NECK SPINE 4/5VWS: CPT | Mod: TC

## 2025-01-14 PROCEDURE — 72050 X-RAY EXAM NECK SPINE 4/5VWS: CPT | Mod: 26,,, | Performed by: RADIOLOGY

## 2025-02-27 DIAGNOSIS — M47.22 OTHER SPONDYLOSIS WITH RADICULOPATHY, CERVICAL REGION: Primary | ICD-10-CM

## 2025-02-27 DIAGNOSIS — M47.22 CERVICAL SPONDYLOSIS WITH RADICULOPATHY: ICD-10-CM

## 2025-03-06 DIAGNOSIS — M54.2 CERVICALGIA: Primary | ICD-10-CM

## 2025-04-01 DIAGNOSIS — M54.2 CERVICAL PAIN: Primary | ICD-10-CM

## 2025-04-05 ENCOUNTER — HOSPITAL ENCOUNTER (OUTPATIENT)
Dept: RADIOLOGY | Facility: HOSPITAL | Age: 55
Discharge: HOME | End: 2025-04-05
Attending: PAIN MEDICINE
Payer: MEDICAID

## 2025-04-05 DIAGNOSIS — M54.2 CERVICAL PAIN: ICD-10-CM

## 2025-04-05 PROCEDURE — 72141 MRI NECK SPINE W/O DYE: CPT | Mod: TC

## 2025-04-05 PROCEDURE — 72141 MRI NECK SPINE W/O DYE: CPT | Mod: 26,,, | Performed by: RADIOLOGY

## 2025-07-10 DIAGNOSIS — M54.50 CHRONIC LOW BACK PAIN, UNSPECIFIED BACK PAIN LATERALITY, UNSPECIFIED WHETHER SCIATICA PRESENT: Primary | ICD-10-CM

## 2025-07-10 DIAGNOSIS — G89.29 CHRONIC LOW BACK PAIN, UNSPECIFIED BACK PAIN LATERALITY, UNSPECIFIED WHETHER SCIATICA PRESENT: Primary | ICD-10-CM

## 2025-07-23 ENCOUNTER — CLINICAL SUPPORT (OUTPATIENT)
Dept: REHABILITATION | Facility: HOSPITAL | Age: 55
End: 2025-07-23
Payer: MEDICAID

## 2025-07-23 DIAGNOSIS — G89.29 CHRONIC LOW BACK PAIN, UNSPECIFIED BACK PAIN LATERALITY, UNSPECIFIED WHETHER SCIATICA PRESENT: Primary | ICD-10-CM

## 2025-07-23 DIAGNOSIS — M54.50 CHRONIC LOW BACK PAIN, UNSPECIFIED BACK PAIN LATERALITY, UNSPECIFIED WHETHER SCIATICA PRESENT: Primary | ICD-10-CM

## 2025-07-23 PROCEDURE — 97530 THERAPEUTIC ACTIVITIES: CPT | Mod: PN

## 2025-07-23 PROCEDURE — 97162 PT EVAL MOD COMPLEX 30 MIN: CPT | Mod: PN

## 2025-07-23 NOTE — PROGRESS NOTES
Outpatient Rehab    Physical Therapy Evaluation    Patient Name: Juan Simons  MRN: [id number]  YOB: 1970  Encounter Date: 7/23/2025    Therapy Diagnosis:   Encounter Diagnosis   Name Primary?    Chronic low back pain, unspecified back pain laterality, unspecified whether sciatica present Yes     Physician: Tim Gonzalez MD    Physician Orders: Eval and Treat  Medical Diagnosis: Chronic low back pain, unspecified back pain laterality, unspecified whether sciatica present  Surgical Diagnosis: Chronic low back pain, unspecified back pain laterality, unspecified whether sciatica present (M54.50, G89.29)   Surgical Date: Not applicable for this Episode  Days Since Last Surgery: Not applicable for this Episode    Visit # / Visits Authorized:  1 / 1  Insurance Authorization Period: 7/10/2025 to 7/10/2026  Date of Evaluation: 7/23/2025  Plan of Care Certification: 7/23/2025 to 9/19/2025     Time In:     Time Out:    Total Time (in minutes):     Total Billable Time (in minutes):      Intake Outcome Measure for FOTO Survey    Therapist reviewed FOTO scores for Juan Simons on 7/23/2025.   FOTO report - see Media section or FOTO account episode details.     Intake Score (%): Not applicable for this Episode    Precautions:       Subjective   History of Present Illness  Juan is a 55 y.o. male who reports to physical therapy with a chief concern of Back pain.           Patient reports a surgery of Chronic low back pain, unspecified back pain laterality, unspecified whether sciatica present (M54.50, G89.29).                History of Present Condition/Illness: Back pain throughout spine. Received RFA's. Has had chronic back pain for years. Tried therapy in 2023 without relief. Anything can make pain worse such as bending over, getting up off of couch, putting shoes on. Uses [facility] due to medication making him feel loopy and wobbly. Couch surfs throughout house. Can't cook, clean house, can't take care of anumals  "anymore due to pain and numbness. Reports bilateral sciatica. When he stands up back pain increases. When he sit down his legs go numb. Best position is neutral sitting but can't find a comfortable position. Sleeping not well, rarely sleeps through night. States that sometimes he can't make it to the bathroom in time due to mobility issues and will dribble. Started to keep a pee bottle near bed and in car. Started in 2016 and has been worsening. Has severe R scolioses and multiple collapsing discs. "I can't exercise anymore and I am gaining weight. 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 Spends most of the day watching tv due to pain, tries to change position and walk around. Sciatic pain will wake up in middle of the night and he has to walk around to subside the pain and get back to sleep- sometimes happens 2-3 times per night. Has noticed he has started to drop things such as glass of water or keys. States his goal is to just be able to function more without relying on others.          Pain     Patient reports a current pain level of 8/10. Pain at best is reported as 2/10. Pain at worst is reported as 9/10.   Location: Spine  Clinical Progression (since onset): Worsening  Pain Qualities: Aching, Burning, Radiating  Pain-Relieving Factors: Change in position, Lying down, Rest, Activity modification, Stretching, Movement  Pain-Aggravating Factors: Computer work, Sitting, Sleeping, Rotation, Head movements, Reaching  Always in pain      Living Arrangements  Living Situation  Housing: Home independently  Living Arrangements: Spouse/significant other    Home Setup  Type of Structure: House        Employment  Does the patient's condition impact their ability to work?: Yes     Unable to work , hairdresser      Past Medical History/Physical Systems Review:   Juan Simons  has no " past medical history on file.    Juan Simons  has no past surgical history on file.    Juan has a current medication list which includes the following prescription(s): balsalazide and omeprazole.    Review of patient's allergies indicates:   Allergen Reactions    Codeine Nausea And Vomiting        Objective   Posture        Shoulders are Asymmetric. Right scapula is: Elevated, Protracted, and Upwardly Rotated  Left scapula is: Depressed         Scoliosis, R rib hump    Right Dermatomes  Right Cervical Dermatome Light Touch  Diminished: C7       Left Dermatomes  Left Cervical Dermatome Light Touch  Diminished: C7           Right Lower Extremity Reflexes  Patellar, L4: Normal (2+)         Achilles, S1: Normal (2+)         Left Lower Extremity Reflexes  Patellar, L4: Brisk (3+)          Achilles, S1: Brisk (3+)             Subcranial Range of Motion   Active Restricted? Passive Restricted? Pain   Flexion         Protraction         Retraction           Cervical Range of Motion   Active (deg) Passive (deg) Pain   Flexion 75   Yes   Extension 50   Yes   Right Lateral Flexion 75       Right Rotation 75       Left Lateral Flexion 50   Yes   Left Rotation 50   Yes          Lumbar Range of Motion   Active (deg) Passive (deg) Pain   Flexion 50 50 Yes   Extension 50 50 Yes   Right Lateral Flexion 50 50 Yes   Right Rotation 50 50 Yes   Left Lateral Flexion 50 50 Yes   Left Rotation 50 50 Yes     Percentages of normal ROM, most pain with flexion      Shoulder Range of Motion     Shoulder, Elbow, or Forearm Range of Motion Details: WNL, painful     Hip Range of Motion    Unable to formally assess 2/2 pain              Hip Strength - Planes of Motion   Right Strength Right Pain Left Strength Left  Pain   Flexion (L2) 3+ Yes 3+ Yes   Extension           ABduction 3+ Yes 3+ Yes   ADduction           Internal Rotation           External Rotation               Knee Strength   Right Strength Right Pain Left Strength Left  Pain    Flexion (S2) 4- Yes 4- Yes   Prone Flexion           Extension (L3) 3+ Yes 3+ Yes          Ankle/Foot Strength - Planes of Motion   Right Strength Right Pain Left Strength Left  Pain   Dorsiflexion (L4) 3+   3+     Plantar Flexion (S1) 3+   3+     Inversion 4-   4-     Eversion 4-   4-     Great Toe Flexion           Great Toe Extension (L5) 4   4     Lesser Toes Flexion           Lesser Toes Extension                       Cervical/Thoracic Special Tests  Thoracic Tests  Positive: Slump, Repeated Flexion, and Repeated Extension                Fall Risk  Functional mobility test results suggest the patient is: At Risk for Falls  Timed Up & Go (TUG)  Time: 19.43 seconds     An older adult who takes >=12 seconds to complete the TUG is at risk for falling.           Gait Analysis  Base of Support: Wide  Gait Pattern: Antalgic, Ataxic  Walking Speed: Decreased    Right Side Walking Observations  Increased: Stance Time and Swing Time  Decreased: Step Length  Right Foot Contact Pattern: Flat foot    Left Side Walking Observations  Increased: Stance Time and Swing Time  Decreased: Step Length  Left Foot Contact Pattern: Flat foot  Trunk Observations During Gait: Asymmetrical rotation    Pelvis Observations During Gait  Bilateral: Excess Transverse Plane Rotation  Hip Observations During Gait  Bilateral: Hip Circumducted  Ankle/Foot Observations During Gait  Bilateral: Flat Foot Initial Contact and Pronated Foot         Treatment:  Therapeutic Activity  TA 1: Patient education  TA 2: Seated TA brace 5x    Time Entry(in minutes):       Assessment & Plan   Assessment  Juan presents with a condition of Moderate complexity.   Presentation of Symptoms: Stable       Functional Limitations: Activity tolerance, Carrying objects, Completing work/school activities, Pain when reaching, Pain with ADLs/IADLs, Sitting tolerance, Reaching, Gait limitations, Getting off the floor, Increased risk of fall, Maintaining balance, Range of  motion, Standing tolerance, Squatting, Manipulating objects, Disrupted sleep pattern, Ambulating on uneven surfaces, Decreased ambulation distance/endurance  Impairments: Activity intolerance, Abnormal or restricted range of motion, Impaired physical strength, Lack of appropriate home exercise program, Pain with functional activity, Impaired balance, Abnormal gait  Personal Factors Affecting Prognosis: Pain, Emotional    Prognosis: Guarded  Assessment Details: Pt presents with weakness and chronic pain consistent with referring diagnosis which limits ADL's and functional activities Upon evaluation patient presents with decreased ROM, joint mobility and flexibility restrictions, decreased strength and motor control contributing to limited functional status at this time. Patient would benefit from appropriate manual therapy, mobility, flexibility, strengthening and NM re-education in order to address deficits and return to prior level of function with ADL's and household specific activities. Patient prognosis is guarded 2/2 to chronicity and level of pain. Attempted to perform TA braces in supine and in sitting but had to stop 2/2 to increased pain.     Plan  From a physical therapy perspective, the patient would benefit from: Skilled Rehab Services    Planned therapy interventions include: Therapeutic exercise, Therapeutic activities, Neuromuscular re-education, Manual therapy, ADLs/IADLs, Other (Comment), and Gait training. Dry Needling (prn)  Planned modalities to include: Biofeedback, Electrical stimulation - attended, Electrical stimulation - passive/unattended, Thermotherapy (hot pack), and Cryotherapy (cold pack).        Visit Frequency: 2 times Per Week for 8 Weeks.       This plan was discussed with Patient.   Discussion participants: Agreed Upon Plan of Care  Plan details: Frequency and duration of treatment to be adjusted as needed          The patient's spiritual, cultural, and educational needs were  considered, and the patient is agreeable to the plan of care and goals.           Goals:   Active       Ambulation/movement       Patient will ambulate 450 feet with least restrictive AD to be able to ambulate within house without taking a seated rest break.        Start:  07/28/25    Expected End:  09/19/25               Functional outcome       Patient will show a significant change in FOTO patient-reported outcome tool to demonstrate subjective improvement       Start:  07/28/25    Expected End:  09/19/25            Patient will demonstrate independence in home program for support of progression       Start:  07/28/25    Expected End:  09/19/25               Pain       Patient will report pain of 2/10 demonstrating a reduction of overall pain       Start:  07/28/25    Expected End:  09/19/25               Range of Motion       Patient will achieve spinal flexion to >/= 75% of normal ROM       Start:  07/28/25    Expected End:  09/19/25            Patient will achieve spinal extension to >/= 75% of normal ROM       Start:  07/28/25    Expected End:  09/19/25            Patient will achieve bilateral spinal side bending to >/= 75% of normal ROM       Start:  07/28/25    Expected End:  09/19/25            Patient will achieve bilateral spinal rotation ROM to >/= 75% of normal ROM        Start:  07/28/25    Expected End:  09/19/25               Strength       Patient will achieve bilateral hip flexion strength of 4+/5       Start:  07/28/25    Expected End:  09/19/25            Patient will achieve bilateral hip extension strength of 4/5       Start:  07/28/25    Expected End:  09/19/25            Patient will achieve bilateral hip abduction strength of 4+/5       Start:  07/28/25    Expected End:  09/19/25            Patient will achieve bilateral hip external rotation strength of 4+/5 in neutral       Start:  07/28/25    Expected End:  09/19/25            Patient will achieve bilateral hip internal rotation  strength of 4+/5 in neutral       Start:  07/28/25    Expected End:  09/19/25            Patient will achieve bilateral knee flexion strength of 4+/5       Start:  07/28/25    Expected End:  09/19/25            Patient will achieve bilateral knee extension strength of 4+/5       Start:  07/28/25    Expected End:  09/19/25                Kevin Yu, PT

## 2025-07-29 ENCOUNTER — CLINICAL SUPPORT (OUTPATIENT)
Dept: REHABILITATION | Facility: HOSPITAL | Age: 55
End: 2025-07-29
Payer: MEDICAID

## 2025-07-29 DIAGNOSIS — M54.50 CHRONIC RIGHT-SIDED LOW BACK PAIN WITHOUT SCIATICA: Chronic | ICD-10-CM

## 2025-07-29 DIAGNOSIS — G89.29 CHRONIC RIGHT-SIDED LOW BACK PAIN WITHOUT SCIATICA: Chronic | ICD-10-CM

## 2025-07-29 DIAGNOSIS — M54.9 BACK PAIN, UNSPECIFIED BACK LOCATION, UNSPECIFIED BACK PAIN LATERALITY, UNSPECIFIED CHRONICITY: Primary | ICD-10-CM

## 2025-07-29 PROCEDURE — 97110 THERAPEUTIC EXERCISES: CPT | Mod: PN

## 2025-08-01 NOTE — PROGRESS NOTES
"  Outpatient Rehab    Physical Therapy Visit    Patient Name: Juan Simons  MRN: [id number]  YOB: 1970  Encounter Date: 7/29/2025    Therapy Diagnosis:   Encounter Diagnoses   Name Primary?    Back pain, unspecified back location, unspecified back pain laterality, unspecified chronicity Yes    Chronic right-sided low back pain without sciatica      Physician: Tim Gonzalez MD    Physician Orders: Eval and Treat  Medical Diagnosis: Chronic low back pain, unspecified back pain laterality, unspecified whether sciatica present  Surgical Diagnosis: Chronic low back pain, unspecified back pain laterality, unspecified whether sciatica present (M54.50, G89.29)   Surgical Date: Not applicable for this Episode  Days Since Last Surgery: Not applicable for this Episode    Visit # / Visits Authorized:  1 / 10  Insurance Authorization Period: 7/23/2025 to 12/31/2025  Date of Evaluation: 7/23/2025  Plan of Care Certification: 7/23/2025 to 9/19/2025      PT/PTA: PT   Number of PTA visits since last PT visit:0  Time In: 1400   Time Out: 1500  Total Time (in minutes): 60   Total Billable Time (in minutes): 38    FOTO:  Intake Score (%): Not applicable for this Episode  Survey Score 2 (%): Not applicable for this Episode  Survey Score 3 (%): Not applicable for this Episode    Precautions:         Subjective   neck, low back, colitis flare up, nausea.  Pain reported as 7/10. always in pain    Objective            Treatment:  Therapeutic Exercise  TE 1: Nu-step level 1 8" to improve CV endurance and tissue extensibility  Balance/Neuromuscular Re-Education  NMR 1: Supine ball squeezes into hip adduction 5" holds 2x10  NMR 2: Supine TA braces 5" holds x10  Therapeutic Activity  TA 1: Supine LTR 10x B 5" holds  TA 2: SAPD with ytb 3x10  TA 3: Rows with ytb 3x10  TA 4: standing hip abduction 2x10 B  TA 5: Standing DL heel raises 2x10  TA 6: step ups 4" block 10x B    Time Entry(in minutes):  Neuromuscular Re-Education Time Entry: " 13  Therapeutic Activity Time Entry: 25    Assessment & Plan   Assessment: Patient completed session as noted above. Patient was limited in participation of skilled exercises 2/2 increase in pain and fatigue. Patient states he has been in pain nearly all day every day and nothing decreases his pain. Today's session focused on gentle ROM and muscle activation of core and hips, along with strengthening of posterior chain. Patient was able to complete session but required frequent breaks and re-adjustment of position for comfort. Will plan to slowly progress as tolerated.        The patient will continue to benefit from skilled outpatient physical therapy in order to address the deficits listed in the problem list on the initial evaluation, provide patient and family education, and maximize the patients level of independence in the home and community environments.     The patient's spiritual, cultural, and educational needs were considered, and the patient is agreeable to the plan of care and goals.           Plan:      Goals:   Active       Ambulation/movement       Patient will ambulate 450 feet with least restrictive AD to be able to ambulate within house without taking a seated rest break.        Start:  07/28/25    Expected End:  09/19/25               Functional outcome       Patient will show a significant change in FOTO patient-reported outcome tool to demonstrate subjective improvement       Start:  07/28/25    Expected End:  09/19/25            Patient will demonstrate independence in home program for support of progression       Start:  07/28/25    Expected End:  09/19/25               Pain       Patient will report pain of 2/10 demonstrating a reduction of overall pain       Start:  07/28/25    Expected End:  09/19/25               Range of Motion       Patient will achieve spinal flexion to >/= 75% of normal ROM       Start:  07/28/25    Expected End:  09/19/25            Patient will achieve spinal  extension to >/= 75% of normal ROM       Start:  07/28/25    Expected End:  09/19/25            Patient will achieve bilateral spinal side bending to >/= 75% of normal ROM       Start:  07/28/25    Expected End:  09/19/25            Patient will achieve bilateral spinal rotation ROM to >/= 75% of normal ROM        Start:  07/28/25    Expected End:  09/19/25               Strength       Patient will achieve bilateral hip flexion strength of 4+/5       Start:  07/28/25    Expected End:  09/19/25            Patient will achieve bilateral hip extension strength of 4/5       Start:  07/28/25    Expected End:  09/19/25            Patient will achieve bilateral hip abduction strength of 4+/5       Start:  07/28/25    Expected End:  09/19/25            Patient will achieve bilateral hip external rotation strength of 4+/5 in neutral       Start:  07/28/25    Expected End:  09/19/25            Patient will achieve bilateral hip internal rotation strength of 4+/5 in neutral       Start:  07/28/25    Expected End:  09/19/25            Patient will achieve bilateral knee flexion strength of 4+/5       Start:  07/28/25    Expected End:  09/19/25            Patient will achieve bilateral knee extension strength of 4+/5       Start:  07/28/25    Expected End:  09/19/25                Kevin Yu, PT

## 2025-08-04 ENCOUNTER — CLINICAL SUPPORT (OUTPATIENT)
Dept: REHABILITATION | Facility: HOSPITAL | Age: 55
End: 2025-08-04
Payer: MEDICAID

## 2025-08-04 DIAGNOSIS — M54.50 CHRONIC RIGHT-SIDED LOW BACK PAIN WITHOUT SCIATICA: Chronic | ICD-10-CM

## 2025-08-04 DIAGNOSIS — M54.9 BACK PAIN, UNSPECIFIED BACK LOCATION, UNSPECIFIED BACK PAIN LATERALITY, UNSPECIFIED CHRONICITY: Primary | ICD-10-CM

## 2025-08-04 DIAGNOSIS — G89.29 CHRONIC RIGHT-SIDED LOW BACK PAIN WITHOUT SCIATICA: Chronic | ICD-10-CM

## 2025-08-04 PROCEDURE — 97110 THERAPEUTIC EXERCISES: CPT | Mod: PN

## 2025-08-04 PROCEDURE — 97112 NEUROMUSCULAR REEDUCATION: CPT | Mod: PN

## 2025-08-04 PROCEDURE — 97530 THERAPEUTIC ACTIVITIES: CPT | Mod: PN

## 2025-08-05 NOTE — PROGRESS NOTES
"  Outpatient Rehab    Physical Therapy Visit    Patient Name: Juan Simons  MRN: [id number]  YOB: 1970  Encounter Date: 8/4/2025    Therapy Diagnosis:   Encounter Diagnoses   Name Primary?    Back pain, unspecified back location, unspecified back pain laterality, unspecified chronicity Yes    Chronic right-sided low back pain without sciatica      Physician: Tim Gonzalez MD    Physician Orders: Eval and Treat  Medical Diagnosis: Chronic low back pain, unspecified back pain laterality, unspecified whether sciatica present  Surgical Diagnosis: Chronic low back pain, unspecified back pain laterality, unspecified whether sciatica present (M54.50, G89.29)   Surgical Date: Not applicable for this Episode  Days Since Last Surgery: Not applicable for this Episode    Visit # / Visits Authorized:  2 / 10  Insurance Authorization Period: 7/23/2025 to 12/31/2025  Date of Evaluation: 7/23/2025  Plan of Care Certification: 7/23/2025 to 9/19/2025      PT/PTA: PT   Number of PTA visits since last PT visit:0  Time In: 1402   Time Out: 1453  Total Time (in minutes): 51   Total Billable Time (in minutes): 45    FOTO:  Intake Score (%): Not applicable for this Episode  Survey Score 2 (%): Not applicable for this Episode  Survey Score 3 (%): Not applicable for this Episode    Precautions:         Subjective   I have been drinking more water and took a muscle relaxer to reduce the amount of cramps i get during exercise. Soreness after last session but already feels like i can be more active. I was able to work in my garden over the weekend for almost 15 minutes!!.  Pain reported as 4/10. mid/low back, neck    Objective            Treatment:  Therapeutic Exercise  TE 1: Nu-step level 1 8" to improve CV endurance and tissue extensibility  TE 2: DL heel raises 3x10 w/ B HHA  Balance/Neuromuscular Re-Education  NMR 1: Supine ball squeezes into hip adduction 5" holds 2x10  NMR 2: Seated TA braces 5" holds x10  Therapeutic " "Activity  TA 1: Supine LTR 10x B 5" holds  TA 2: SAPD with gtb 3x10  TA 3: Rows with gtb 3x10  TA 4: standing hip abduction 2x10 B  TA 5: Standing DL heel raises 2x10  TA 6: step ups 6" stairs 10x B  TA 7: paloff press single gtb 2x10 B    Time Entry(in minutes):  Neuromuscular Re-Education Time Entry: 10  Therapeutic Activity Time Entry: 25  Therapeutic Exercise Time Entry: 10    Assessment & Plan   Assessment: Patient completed session as noted above. Patient with increased ability to perform exercises today with only modifications needed for supine TA braces to seated. Patient states his pain has been a little better than usual but still constant 4/10. Today's session focused on gentle ROM and muscle activation of core and hips, along with strengthening of posterior chain. At end of session patient states he overall feels better, but has increased neck stiffness pain. Plan to implement neck strengthening and ROM exercises next visit.        The patient will continue to benefit from skilled outpatient physical therapy in order to address the deficits listed in the problem list on the initial evaluation, provide patient and family education, and maximize the patients level of independence in the home and community environments.     The patient's spiritual, cultural, and educational needs were considered, and the patient is agreeable to the plan of care and goals.           Plan:      Goals:   Active       Ambulation/movement       Patient will ambulate 450 feet with least restrictive AD to be able to ambulate within house without taking a seated rest break.        Start:  07/28/25    Expected End:  09/19/25               Functional outcome       Patient will show a significant change in FOTO patient-reported outcome tool to demonstrate subjective improvement       Start:  07/28/25    Expected End:  09/19/25            Patient will demonstrate independence in home program for support of progression       Start:  " 07/28/25    Expected End:  09/19/25               Pain       Patient will report pain of 2/10 demonstrating a reduction of overall pain       Start:  07/28/25    Expected End:  09/19/25               Range of Motion       Patient will achieve spinal flexion to >/= 75% of normal ROM       Start:  07/28/25    Expected End:  09/19/25            Patient will achieve spinal extension to >/= 75% of normal ROM       Start:  07/28/25    Expected End:  09/19/25            Patient will achieve bilateral spinal side bending to >/= 75% of normal ROM       Start:  07/28/25    Expected End:  09/19/25            Patient will achieve bilateral spinal rotation ROM to >/= 75% of normal ROM        Start:  07/28/25    Expected End:  09/19/25               Strength       Patient will achieve bilateral hip flexion strength of 4+/5       Start:  07/28/25    Expected End:  09/19/25            Patient will achieve bilateral hip extension strength of 4/5       Start:  07/28/25    Expected End:  09/19/25            Patient will achieve bilateral hip abduction strength of 4+/5       Start:  07/28/25    Expected End:  09/19/25            Patient will achieve bilateral hip external rotation strength of 4+/5 in neutral       Start:  07/28/25    Expected End:  09/19/25            Patient will achieve bilateral hip internal rotation strength of 4+/5 in neutral       Start:  07/28/25    Expected End:  09/19/25            Patient will achieve bilateral knee flexion strength of 4+/5       Start:  07/28/25    Expected End:  09/19/25            Patient will achieve bilateral knee extension strength of 4+/5       Start:  07/28/25    Expected End:  09/19/25                Kevin Yu PT

## 2025-08-07 ENCOUNTER — CLINICAL SUPPORT (OUTPATIENT)
Dept: REHABILITATION | Facility: HOSPITAL | Age: 55
End: 2025-08-07
Payer: MEDICAID

## 2025-08-07 DIAGNOSIS — M54.9 BACK PAIN, UNSPECIFIED BACK LOCATION, UNSPECIFIED BACK PAIN LATERALITY, UNSPECIFIED CHRONICITY: Primary | ICD-10-CM

## 2025-08-07 DIAGNOSIS — M54.50 CHRONIC RIGHT-SIDED LOW BACK PAIN WITHOUT SCIATICA: Chronic | ICD-10-CM

## 2025-08-07 DIAGNOSIS — G89.29 CHRONIC RIGHT-SIDED LOW BACK PAIN WITHOUT SCIATICA: Chronic | ICD-10-CM

## 2025-08-07 PROCEDURE — 97530 THERAPEUTIC ACTIVITIES: CPT | Mod: PN

## 2025-08-07 PROCEDURE — 97110 THERAPEUTIC EXERCISES: CPT | Mod: PN

## 2025-08-07 PROCEDURE — 97112 NEUROMUSCULAR REEDUCATION: CPT | Mod: PN

## 2025-08-13 ENCOUNTER — CLINICAL SUPPORT (OUTPATIENT)
Dept: REHABILITATION | Facility: HOSPITAL | Age: 55
End: 2025-08-13
Payer: MEDICAID

## 2025-08-13 DIAGNOSIS — M54.50 CHRONIC RIGHT-SIDED LOW BACK PAIN WITHOUT SCIATICA: Chronic | ICD-10-CM

## 2025-08-13 DIAGNOSIS — G89.29 CHRONIC RIGHT-SIDED LOW BACK PAIN WITHOUT SCIATICA: Chronic | ICD-10-CM

## 2025-08-13 DIAGNOSIS — M54.9 BACK PAIN, UNSPECIFIED BACK LOCATION, UNSPECIFIED BACK PAIN LATERALITY, UNSPECIFIED CHRONICITY: Primary | ICD-10-CM

## 2025-08-13 PROCEDURE — 97110 THERAPEUTIC EXERCISES: CPT | Mod: PN

## 2025-08-18 ENCOUNTER — CLINICAL SUPPORT (OUTPATIENT)
Dept: REHABILITATION | Facility: HOSPITAL | Age: 55
End: 2025-08-18
Payer: MEDICAID

## 2025-08-18 DIAGNOSIS — M54.9 BACK PAIN, UNSPECIFIED BACK LOCATION, UNSPECIFIED BACK PAIN LATERALITY, UNSPECIFIED CHRONICITY: Primary | ICD-10-CM

## 2025-08-18 DIAGNOSIS — G89.29 CHRONIC RIGHT-SIDED LOW BACK PAIN WITHOUT SCIATICA: Chronic | ICD-10-CM

## 2025-08-18 DIAGNOSIS — M54.50 CHRONIC RIGHT-SIDED LOW BACK PAIN WITHOUT SCIATICA: Chronic | ICD-10-CM

## 2025-08-18 PROCEDURE — 97110 THERAPEUTIC EXERCISES: CPT | Mod: PN,CQ

## 2025-08-21 ENCOUNTER — CLINICAL SUPPORT (OUTPATIENT)
Dept: REHABILITATION | Facility: HOSPITAL | Age: 55
End: 2025-08-21
Payer: MEDICAID

## 2025-08-21 DIAGNOSIS — M54.9 BACK PAIN, UNSPECIFIED BACK LOCATION, UNSPECIFIED BACK PAIN LATERALITY, UNSPECIFIED CHRONICITY: Primary | ICD-10-CM

## 2025-08-21 DIAGNOSIS — G89.29 CHRONIC RIGHT-SIDED LOW BACK PAIN WITHOUT SCIATICA: Chronic | ICD-10-CM

## 2025-08-21 DIAGNOSIS — M54.50 CHRONIC RIGHT-SIDED LOW BACK PAIN WITHOUT SCIATICA: Chronic | ICD-10-CM

## 2025-08-21 PROCEDURE — 97110 THERAPEUTIC EXERCISES: CPT | Mod: PN

## 2025-08-26 ENCOUNTER — CLINICAL SUPPORT (OUTPATIENT)
Dept: REHABILITATION | Facility: HOSPITAL | Age: 55
End: 2025-08-26
Payer: MEDICAID

## 2025-08-26 DIAGNOSIS — G89.29 CHRONIC RIGHT-SIDED LOW BACK PAIN WITHOUT SCIATICA: Chronic | ICD-10-CM

## 2025-08-26 DIAGNOSIS — M54.50 CHRONIC RIGHT-SIDED LOW BACK PAIN WITHOUT SCIATICA: Chronic | ICD-10-CM

## 2025-08-26 DIAGNOSIS — M54.9 BACK PAIN, UNSPECIFIED BACK LOCATION, UNSPECIFIED BACK PAIN LATERALITY, UNSPECIFIED CHRONICITY: Primary | ICD-10-CM

## 2025-08-26 PROCEDURE — 97110 THERAPEUTIC EXERCISES: CPT | Mod: PN
